# Patient Record
Sex: FEMALE | URBAN - NONMETROPOLITAN AREA
[De-identification: names, ages, dates, MRNs, and addresses within clinical notes are randomized per-mention and may not be internally consistent; named-entity substitution may affect disease eponyms.]

---

## 2021-06-28 ENCOUNTER — APPOINTMENT (RX ONLY)
Dept: URBAN - NONMETROPOLITAN AREA CLINIC 1 | Facility: CLINIC | Age: 46
Setting detail: DERMATOLOGY
End: 2021-06-28

## 2021-06-28 DIAGNOSIS — L81.1 CHLOASMA: ICD-10-CM

## 2021-06-28 PROCEDURE — ? ADDITIONAL NOTES

## 2021-06-28 PROCEDURE — ? COUNSELING

## 2021-06-28 PROCEDURE — ? TREATMENT REGIMEN

## 2021-06-28 PROCEDURE — 99203 OFFICE O/P NEW LOW 30 MIN: CPT

## 2021-06-28 ASSESSMENT — LOCATION DETAILED DESCRIPTION DERM
LOCATION DETAILED: RIGHT CENTRAL MALAR CHEEK
LOCATION DETAILED: LEFT CENTRAL MALAR CHEEK

## 2021-06-28 ASSESSMENT — LOCATION ZONE DERM: LOCATION ZONE: FACE

## 2021-06-28 ASSESSMENT — LOCATION SIMPLE DESCRIPTION DERM
LOCATION SIMPLE: RIGHT CHEEK
LOCATION SIMPLE: LEFT CHEEK

## 2021-06-28 NOTE — PROCEDURE: TREATMENT REGIMEN
Detail Level: Detailed
Plan: Discussed using RE 4% brightening pads\\nSchedule consult with Dipti NUNEZ

## 2021-09-20 ENCOUNTER — APPOINTMENT (RX ONLY)
Dept: URBAN - NONMETROPOLITAN AREA CLINIC 1 | Facility: CLINIC | Age: 46
Setting detail: DERMATOLOGY
End: 2021-09-20

## 2021-09-20 DIAGNOSIS — Z02.9 ENCOUNTER FOR ADMINISTRATIVE EXAMINATIONS, UNSPECIFIED: ICD-10-CM

## 2021-10-04 ENCOUNTER — APPOINTMENT (RX ONLY)
Dept: URBAN - NONMETROPOLITAN AREA CLINIC 1 | Facility: CLINIC | Age: 46
Setting detail: DERMATOLOGY
End: 2021-10-04

## 2021-10-04 DIAGNOSIS — L81.1 CHLOASMA: ICD-10-CM | Status: IMPROVED

## 2021-10-04 PROCEDURE — ? TREATMENT REGIMEN

## 2021-10-04 PROCEDURE — ? PHOTO-DOCUMENTATION

## 2021-10-04 PROCEDURE — ? FULL BODY SKIN EXAM - DECLINED

## 2021-10-04 PROCEDURE — ? COUNSELING

## 2021-10-04 PROCEDURE — 99213 OFFICE O/P EST LOW 20 MIN: CPT

## 2021-10-04 PROCEDURE — ? ADDITIONAL NOTES

## 2021-10-04 ASSESSMENT — LOCATION SIMPLE DESCRIPTION DERM
LOCATION SIMPLE: LEFT CHEEK
LOCATION SIMPLE: RIGHT CHEEK

## 2021-10-04 ASSESSMENT — LOCATION DETAILED DESCRIPTION DERM
LOCATION DETAILED: RIGHT INFERIOR CENTRAL MALAR CHEEK
LOCATION DETAILED: LEFT INFERIOR CENTRAL MALAR CHEEK

## 2021-10-04 ASSESSMENT — LOCATION ZONE DERM: LOCATION ZONE: FACE

## 2021-10-04 NOTE — PROCEDURE: TREATMENT REGIMEN
Plan: Consider increasing RE brightening pads from 4% to 6%
Initiate Treatment: RE brightening pads 6% BID to AA face
Detail Level: Detailed

## 2024-10-25 NOTE — PROCEDURE: FULL BODY SKIN EXAM - DECLINED
Instructions: This plan will send the code FBSD to the PM system.  DO NOT or CHANGE the price.
Detail Level: Simple
Price (Do Not Change): 0.00
3-5x/week